# Patient Record
Sex: FEMALE | Race: BLACK OR AFRICAN AMERICAN | NOT HISPANIC OR LATINO | URBAN - METROPOLITAN AREA
[De-identification: names, ages, dates, MRNs, and addresses within clinical notes are randomized per-mention and may not be internally consistent; named-entity substitution may affect disease eponyms.]

---

## 2023-05-30 ENCOUNTER — HOSPITAL ENCOUNTER (OUTPATIENT)
Facility: OTHER | Age: 58
Discharge: HOME | End: 2023-05-31
Attending: EMERGENCY MEDICINE | Admitting: HOSPITALIST
Payer: MEDICAID

## 2023-05-30 DIAGNOSIS — D64.9 ANEMIA: ICD-10-CM

## 2023-05-30 DIAGNOSIS — R53.1 WEAKNESS: Primary | ICD-10-CM

## 2023-05-30 DIAGNOSIS — R06.09 DOE (DYSPNEA ON EXERTION): ICD-10-CM

## 2023-05-30 DIAGNOSIS — R07.9 CHEST PAIN: ICD-10-CM

## 2023-05-30 PROBLEM — R63.4 ABNORMAL INTENTIONAL WEIGHT LOSS: Status: ACTIVE | Noted: 2023-05-30

## 2023-05-30 PROBLEM — F40.232 FEAR ASSOCIATED WITH HEALTHCARE: Chronic | Status: ACTIVE | Noted: 2023-05-30

## 2023-05-30 PROBLEM — E87.6 HYPOKALEMIA: Status: ACTIVE | Noted: 2023-05-30

## 2023-05-30 PROBLEM — R42 LIGHTHEADEDNESS: Chronic | Status: ACTIVE | Noted: 2023-05-30

## 2023-05-30 LAB
ABO + RH BLD: NORMAL
ABO GROUP BLD: NORMAL
ALBUMIN SERPL BCP-MCNC: 3.6 G/DL (ref 3.5–5.2)
ALP SERPL-CCNC: 67 U/L (ref 55–135)
ALT SERPL W/O P-5'-P-CCNC: 10 U/L (ref 10–44)
ANION GAP SERPL CALC-SCNC: 9 MMOL/L (ref 8–16)
ANISOCYTOSIS BLD QL SMEAR: SLIGHT
ANISOCYTOSIS BLD QL SMEAR: SLIGHT
AST SERPL-CCNC: 12 U/L (ref 10–40)
BASOPHILS # BLD AUTO: 0.01 K/UL (ref 0–0.2)
BASOPHILS # BLD AUTO: 0.01 K/UL (ref 0–0.2)
BASOPHILS NFR BLD: 0.2 % (ref 0–1.9)
BASOPHILS NFR BLD: 0.2 % (ref 0–1.9)
BILIRUB SERPL-MCNC: 0.3 MG/DL (ref 0.1–1)
BILIRUB UR QL STRIP: NEGATIVE
BLD GP AB SCN CELLS X3 SERPL QL: NORMAL
BLD PROD TYP BPU: NORMAL
BLD PROD TYP BPU: NORMAL
BLOOD UNIT EXPIRATION DATE: NORMAL
BLOOD UNIT EXPIRATION DATE: NORMAL
BLOOD UNIT TYPE CODE: 600
BLOOD UNIT TYPE CODE: 6200
BLOOD UNIT TYPE: NORMAL
BLOOD UNIT TYPE: NORMAL
BUN SERPL-MCNC: 6 MG/DL (ref 6–20)
CALCIUM SERPL-MCNC: 8.8 MG/DL (ref 8.7–10.5)
CHLORIDE SERPL-SCNC: 110 MMOL/L (ref 95–110)
CLARITY UR: CLEAR
CO2 SERPL-SCNC: 22 MMOL/L (ref 23–29)
CODING SYSTEM: NORMAL
CODING SYSTEM: NORMAL
COLOR UR: COLORLESS
CREAT SERPL-MCNC: 0.6 MG/DL (ref 0.5–1.4)
CROSSMATCH INTERPRETATION: NORMAL
CROSSMATCH INTERPRETATION: NORMAL
DACRYOCYTES BLD QL SMEAR: ABNORMAL
DACRYOCYTES BLD QL SMEAR: ABNORMAL
DIFFERENTIAL METHOD: ABNORMAL
DIFFERENTIAL METHOD: ABNORMAL
DISPENSE STATUS: NORMAL
DISPENSE STATUS: NORMAL
EOSINOPHIL # BLD AUTO: 0.1 K/UL (ref 0–0.5)
EOSINOPHIL # BLD AUTO: 0.1 K/UL (ref 0–0.5)
EOSINOPHIL NFR BLD: 1.1 % (ref 0–8)
EOSINOPHIL NFR BLD: 1.1 % (ref 0–8)
ERYTHROCYTE [DISTWIDTH] IN BLOOD BY AUTOMATED COUNT: 23.7 % (ref 11.5–14.5)
ERYTHROCYTE [DISTWIDTH] IN BLOOD BY AUTOMATED COUNT: 23.7 % (ref 11.5–14.5)
EST. GFR  (NO RACE VARIABLE): >60 ML/MIN/1.73 M^2
FERRITIN SERPL-MCNC: 3 NG/ML (ref 20–300)
GLUCOSE SERPL-MCNC: 97 MG/DL (ref 70–110)
GLUCOSE UR QL STRIP: NEGATIVE
HCT VFR BLD AUTO: 12.6 % (ref 37–48.5)
HCT VFR BLD AUTO: 13.1 % (ref 37–48.5)
HGB BLD-MCNC: 3 G/DL (ref 12–16)
HGB BLD-MCNC: 3.1 G/DL (ref 12–16)
HGB UR QL STRIP: NEGATIVE
HYPOCHROMIA BLD QL SMEAR: ABNORMAL
HYPOCHROMIA BLD QL SMEAR: ABNORMAL
IMM GRANULOCYTES # BLD AUTO: 0.02 K/UL (ref 0–0.04)
IMM GRANULOCYTES # BLD AUTO: 0.03 K/UL (ref 0–0.04)
IMM GRANULOCYTES NFR BLD AUTO: 0.3 % (ref 0–0.5)
IMM GRANULOCYTES NFR BLD AUTO: 0.5 % (ref 0–0.5)
IRON SERPL-MCNC: 12 UG/DL (ref 30–160)
KETONES UR QL STRIP: NEGATIVE
LEUKOCYTE ESTERASE UR QL STRIP: NEGATIVE
LIPASE SERPL-CCNC: 50 U/L (ref 4–60)
LYMPHOCYTES # BLD AUTO: 1.5 K/UL (ref 1–4.8)
LYMPHOCYTES # BLD AUTO: 1.5 K/UL (ref 1–4.8)
LYMPHOCYTES NFR BLD: 22.8 % (ref 18–48)
LYMPHOCYTES NFR BLD: 25.2 % (ref 18–48)
MCH RBC QN AUTO: 14.4 PG (ref 27–31)
MCH RBC QN AUTO: 14.6 PG (ref 27–31)
MCHC RBC AUTO-ENTMCNC: 23.7 G/DL (ref 32–36)
MCHC RBC AUTO-ENTMCNC: 23.8 G/DL (ref 32–36)
MCV RBC AUTO: 61 FL (ref 82–98)
MCV RBC AUTO: 62 FL (ref 82–98)
MONOCYTES # BLD AUTO: 0.5 K/UL (ref 0.3–1)
MONOCYTES # BLD AUTO: 0.6 K/UL (ref 0.3–1)
MONOCYTES NFR BLD: 7.5 % (ref 4–15)
MONOCYTES NFR BLD: 8.6 % (ref 4–15)
NEUTROPHILS # BLD AUTO: 4 K/UL (ref 1.8–7.7)
NEUTROPHILS # BLD AUTO: 4.3 K/UL (ref 1.8–7.7)
NEUTROPHILS NFR BLD: 65.7 % (ref 38–73)
NEUTROPHILS NFR BLD: 66.8 % (ref 38–73)
NITRITE UR QL STRIP: NEGATIVE
NRBC BLD-RTO: 0 /100 WBC
NRBC BLD-RTO: 0 /100 WBC
NUM UNITS TRANS PACKED RBC: NORMAL
OVALOCYTES BLD QL SMEAR: ABNORMAL
OVALOCYTES BLD QL SMEAR: ABNORMAL
PH UR STRIP: 6 [PH] (ref 5–8)
PLATELET # BLD AUTO: 261 K/UL (ref 150–450)
PLATELET # BLD AUTO: 274 K/UL (ref 150–450)
PLATELET BLD QL SMEAR: ABNORMAL
PLATELET BLD QL SMEAR: ABNORMAL
PMV BLD AUTO: 10.5 FL (ref 9.2–12.9)
PMV BLD AUTO: 9.4 FL (ref 9.2–12.9)
POIKILOCYTOSIS BLD QL SMEAR: SLIGHT
POIKILOCYTOSIS BLD QL SMEAR: SLIGHT
POTASSIUM SERPL-SCNC: 3.1 MMOL/L (ref 3.5–5.1)
PROT SERPL-MCNC: 7.2 G/DL (ref 6–8.4)
PROT UR QL STRIP: NEGATIVE
RBC # BLD AUTO: 2.05 M/UL (ref 4–5.4)
RBC # BLD AUTO: 2.16 M/UL (ref 4–5.4)
RH BLD: NORMAL
SATURATED IRON: 2 % (ref 20–50)
SODIUM SERPL-SCNC: 141 MMOL/L (ref 136–145)
SP GR UR STRIP: 1.01 (ref 1–1.03)
SPECIMEN OUTDATE: NORMAL
TOTAL IRON BINDING CAPACITY: 570 UG/DL (ref 250–450)
TRANS ERYTHROCYTES VOL PATIENT: NORMAL ML
TRANSFERRIN SERPL-MCNC: 385 MG/DL (ref 200–375)
URN SPEC COLLECT METH UR: ABNORMAL
UROBILINOGEN UR STRIP-ACNC: NEGATIVE EU/DL
WBC # BLD AUTO: 6.1 K/UL (ref 3.9–12.7)
WBC # BLD AUTO: 6.49 K/UL (ref 3.9–12.7)

## 2023-05-30 PROCEDURE — 86920 COMPATIBILITY TEST SPIN: CPT | Performed by: HOSPITALIST

## 2023-05-30 PROCEDURE — 96374 THER/PROPH/DIAG INJ IV PUSH: CPT

## 2023-05-30 PROCEDURE — 93010 ELECTROCARDIOGRAM REPORT: CPT | Mod: ,,, | Performed by: INTERNAL MEDICINE

## 2023-05-30 PROCEDURE — 80053 COMPREHEN METABOLIC PANEL: CPT | Performed by: PHYSICIAN ASSISTANT

## 2023-05-30 PROCEDURE — 99285 EMERGENCY DEPT VISIT HI MDM: CPT | Mod: 25

## 2023-05-30 PROCEDURE — 99236 PR OBSERV/HOSP SAME DATE,LEVL V: ICD-10-PCS | Mod: ,,,

## 2023-05-30 PROCEDURE — 83690 ASSAY OF LIPASE: CPT | Performed by: PHYSICIAN ASSISTANT

## 2023-05-30 PROCEDURE — 36415 COLL VENOUS BLD VENIPUNCTURE: CPT | Performed by: EMERGENCY MEDICINE

## 2023-05-30 PROCEDURE — 84466 ASSAY OF TRANSFERRIN: CPT | Performed by: PHYSICIAN ASSISTANT

## 2023-05-30 PROCEDURE — 81003 URINALYSIS AUTO W/O SCOPE: CPT | Performed by: PHYSICIAN ASSISTANT

## 2023-05-30 PROCEDURE — C9113 INJ PANTOPRAZOLE SODIUM, VIA: HCPCS

## 2023-05-30 PROCEDURE — 86900 BLOOD TYPING SEROLOGIC ABO: CPT | Performed by: EMERGENCY MEDICINE

## 2023-05-30 PROCEDURE — 93005 ELECTROCARDIOGRAM TRACING: CPT

## 2023-05-30 PROCEDURE — 93010 EKG 12-LEAD: ICD-10-PCS | Mod: ,,, | Performed by: INTERNAL MEDICINE

## 2023-05-30 PROCEDURE — 36430 TRANSFUSION BLD/BLD COMPNT: CPT

## 2023-05-30 PROCEDURE — 99236 HOSP IP/OBS SAME DATE HI 85: CPT | Mod: ,,,

## 2023-05-30 PROCEDURE — 96375 TX/PRO/DX INJ NEW DRUG ADDON: CPT

## 2023-05-30 PROCEDURE — 63600175 PHARM REV CODE 636 W HCPCS

## 2023-05-30 PROCEDURE — 86920 COMPATIBILITY TEST SPIN: CPT | Performed by: EMERGENCY MEDICINE

## 2023-05-30 PROCEDURE — P9016 RBC LEUKOCYTES REDUCED: HCPCS | Performed by: EMERGENCY MEDICINE

## 2023-05-30 PROCEDURE — 85025 COMPLETE CBC W/AUTO DIFF WBC: CPT | Performed by: EMERGENCY MEDICINE

## 2023-05-30 PROCEDURE — 82728 ASSAY OF FERRITIN: CPT | Performed by: PHYSICIAN ASSISTANT

## 2023-05-30 PROCEDURE — 86920 COMPATIBILITY TEST SPIN: CPT | Performed by: NURSE PRACTITIONER

## 2023-05-30 PROCEDURE — 25000003 PHARM REV CODE 250

## 2023-05-30 PROCEDURE — G0378 HOSPITAL OBSERVATION PER HR: HCPCS

## 2023-05-30 PROCEDURE — P9021 RED BLOOD CELLS UNIT: HCPCS

## 2023-05-30 PROCEDURE — 86850 RBC ANTIBODY SCREEN: CPT | Performed by: EMERGENCY MEDICINE

## 2023-05-30 PROCEDURE — 85025 COMPLETE CBC W/AUTO DIFF WBC: CPT | Mod: 91 | Performed by: PHYSICIAN ASSISTANT

## 2023-05-30 PROCEDURE — 86920 COMPATIBILITY TEST SPIN: CPT

## 2023-05-30 PROCEDURE — 25000003 PHARM REV CODE 250: Performed by: EMERGENCY MEDICINE

## 2023-05-30 PROCEDURE — 96361 HYDRATE IV INFUSION ADD-ON: CPT

## 2023-05-30 RX ORDER — ONDANSETRON 2 MG/ML
4 INJECTION INTRAMUSCULAR; INTRAVENOUS EVERY 8 HOURS PRN
Status: DISCONTINUED | OUTPATIENT
Start: 2023-05-30 | End: 2023-05-31 | Stop reason: HOSPADM

## 2023-05-30 RX ORDER — MAG HYDROX/ALUMINUM HYD/SIMETH 200-200-20
30 SUSPENSION, ORAL (FINAL DOSE FORM) ORAL 4 TIMES DAILY PRN
Status: DISCONTINUED | OUTPATIENT
Start: 2023-05-30 | End: 2023-05-31 | Stop reason: HOSPADM

## 2023-05-30 RX ORDER — ONDANSETRON 2 MG/ML
4 INJECTION INTRAMUSCULAR; INTRAVENOUS
Status: DISCONTINUED | OUTPATIENT
Start: 2023-05-30 | End: 2023-05-30

## 2023-05-30 RX ORDER — POTASSIUM CHLORIDE 20 MEQ/1
20 TABLET, EXTENDED RELEASE ORAL ONCE
Status: COMPLETED | OUTPATIENT
Start: 2023-05-30 | End: 2023-05-30

## 2023-05-30 RX ORDER — ONDANSETRON 4 MG/1
4 TABLET, ORALLY DISINTEGRATING ORAL
Status: COMPLETED | OUTPATIENT
Start: 2023-05-30 | End: 2023-05-30

## 2023-05-30 RX ORDER — SODIUM CHLORIDE 0.9 % (FLUSH) 0.9 %
10 SYRINGE (ML) INJECTION EVERY 12 HOURS PRN
Status: DISCONTINUED | OUTPATIENT
Start: 2023-05-30 | End: 2023-05-31 | Stop reason: HOSPADM

## 2023-05-30 RX ORDER — HYDROCODONE BITARTRATE AND ACETAMINOPHEN 500; 5 MG/1; MG/1
TABLET ORAL
Status: DISCONTINUED | OUTPATIENT
Start: 2023-05-30 | End: 2023-05-31 | Stop reason: HOSPADM

## 2023-05-30 RX ORDER — NALOXONE HCL 0.4 MG/ML
0.02 VIAL (ML) INJECTION
Status: DISCONTINUED | OUTPATIENT
Start: 2023-05-30 | End: 2023-05-31 | Stop reason: HOSPADM

## 2023-05-30 RX ORDER — FERROUS SULFATE, DRIED 160(50) MG
1 TABLET, EXTENDED RELEASE ORAL 2 TIMES DAILY WITH MEALS
Status: DISCONTINUED | OUTPATIENT
Start: 2023-05-31 | End: 2023-05-31 | Stop reason: HOSPADM

## 2023-05-30 RX ORDER — SODIUM CHLORIDE, SODIUM LACTATE, POTASSIUM CHLORIDE, CALCIUM CHLORIDE 600; 310; 30; 20 MG/100ML; MG/100ML; MG/100ML; MG/100ML
INJECTION, SOLUTION INTRAVENOUS CONTINUOUS
Status: DISCONTINUED | OUTPATIENT
Start: 2023-05-30 | End: 2023-05-31 | Stop reason: HOSPADM

## 2023-05-30 RX ORDER — AMOXICILLIN 250 MG
1 CAPSULE ORAL DAILY PRN
Status: DISCONTINUED | OUTPATIENT
Start: 2023-05-30 | End: 2023-05-31 | Stop reason: HOSPADM

## 2023-05-30 RX ORDER — ACETAMINOPHEN 500 MG
1000 TABLET ORAL EVERY 8 HOURS PRN
Status: DISCONTINUED | OUTPATIENT
Start: 2023-05-30 | End: 2023-05-31 | Stop reason: HOSPADM

## 2023-05-30 RX ORDER — FERROUS SULFATE, DRIED 160(50) MG
1 TABLET, EXTENDED RELEASE ORAL 2 TIMES DAILY WITH MEALS
COMMUNITY

## 2023-05-30 RX ORDER — PANTOPRAZOLE SODIUM 40 MG/10ML
40 INJECTION, POWDER, LYOPHILIZED, FOR SOLUTION INTRAVENOUS 2 TIMES DAILY
Status: DISCONTINUED | OUTPATIENT
Start: 2023-05-30 | End: 2023-05-31 | Stop reason: HOSPADM

## 2023-05-30 RX ORDER — SIMETHICONE 80 MG
1 TABLET,CHEWABLE ORAL 4 TIMES DAILY PRN
Status: DISCONTINUED | OUTPATIENT
Start: 2023-05-30 | End: 2023-05-31 | Stop reason: HOSPADM

## 2023-05-30 RX ORDER — PROCHLORPERAZINE EDISYLATE 5 MG/ML
5 INJECTION INTRAMUSCULAR; INTRAVENOUS EVERY 6 HOURS PRN
Status: DISCONTINUED | OUTPATIENT
Start: 2023-05-30 | End: 2023-05-31 | Stop reason: HOSPADM

## 2023-05-30 RX ORDER — BISACODYL 10 MG
10 SUPPOSITORY, RECTAL RECTAL DAILY PRN
Status: DISCONTINUED | OUTPATIENT
Start: 2023-05-30 | End: 2023-05-31 | Stop reason: HOSPADM

## 2023-05-30 RX ORDER — TALC
6 POWDER (GRAM) TOPICAL NIGHTLY PRN
Status: DISCONTINUED | OUTPATIENT
Start: 2023-05-30 | End: 2023-05-31 | Stop reason: HOSPADM

## 2023-05-30 RX ADMIN — ACETAMINOPHEN 1000 MG: 500 TABLET, FILM COATED ORAL at 04:05

## 2023-05-30 RX ADMIN — ONDANSETRON 4 MG: 2 INJECTION INTRAMUSCULAR; INTRAVENOUS at 08:05

## 2023-05-30 RX ADMIN — Medication 6 MG: at 08:05

## 2023-05-30 RX ADMIN — ONDANSETRON 4 MG: 4 TABLET, ORALLY DISINTEGRATING ORAL at 11:05

## 2023-05-30 RX ADMIN — POTASSIUM CHLORIDE 20 MEQ: 1500 TABLET, EXTENDED RELEASE ORAL at 08:05

## 2023-05-30 RX ADMIN — PANTOPRAZOLE SODIUM 40 MG: 40 INJECTION, POWDER, LYOPHILIZED, FOR SOLUTION INTRAVENOUS at 08:05

## 2023-05-30 RX ADMIN — SODIUM CHLORIDE, POTASSIUM CHLORIDE, SODIUM LACTATE AND CALCIUM CHLORIDE: 600; 310; 30; 20 INJECTION, SOLUTION INTRAVENOUS at 09:05

## 2023-05-30 NOTE — Clinical Note
Diagnosis: Anemia [[id number]]   Future Attending Provider: DAYAMI WELCH [[id number]]   Admitting Provider:: DAYAMI WELCH [[id number]]

## 2023-05-30 NOTE — ED TRIAGE NOTES
N/V/D over past 2-3 days. Last emesis this morning, last diarrhea yesterday. Believes she ate some spoiled food. Subjective fever reported. Patient also reports chronic SOB with SWIFT - only able to walk short distances without feeling SOB. Presents in no distress.

## 2023-05-30 NOTE — ASSESSMENT & PLAN NOTE
"No hx of HTN, no cardiac hx, not taking any regular medication. Feeling lightheadedness for "a few months now". Vitals on arrival 95/52, HR 63. Pox 100%. Labs on arrival with H/H 3.0/12.6, currently having 2U RBC transfusion.     - Check orthostatics prior to discharge after completion of transfusion  "

## 2023-05-30 NOTE — ED PROVIDER NOTES
Encounter Date: 5/30/2023    SCRIBE #1 NOTE: IYuliana, uche scribing for, and in the presence of,  Julio Cesar Ramirez MD. I have scribed the following portions of the note - Other sections scribed: HPI, ROS.     History     Chief Complaint   Patient presents with    Vomiting     Patietn to ED with c.o. abdominal pain with associated nausea, vomiting, and diarrhea x 2 days after eating food at rouses that she believes was spoiled. Patient denies hematemesis/ bloody stools. Patient endorses subjective fever/ chills at home. Patient reports her last episode of vomiting was last night.      Time seen by provider: 10:37 AM    This is a 57 y.o. female who presents with complaint of worsening upper abdominal pain. Patient recalls having some spoiled ham from the grocery store three days ago, and started having nausea, vomiting, and diarrhea the next day. Her last episode of diarrhea occurred last night and she last vomited this morning. Other associated symptoms include lower back pains. While she does not take any prescription medications and denies any medical history, patient does admit that she avoids seeing the doctor. This is the extent of the patient's complaints at this time.    The history is provided by the patient.   Review of patient's allergies indicates:  No Known Allergies  History reviewed. No pertinent past medical history.  History reviewed. No pertinent surgical history.  Family History   Problem Relation Age of Onset    Breast cancer Mother     Throat cancer Father     Lung cancer Father     Diabetes Sister      Social History     Tobacco Use    Smoking status: Some Days     Types: Cigarettes    Smokeless tobacco: Never    Tobacco comments:     Occasional cigarette   Substance Use Topics    Alcohol use: Yes     Alcohol/week: 8.0 standard drinks     Types: 8 Drinks containing 0.5 oz of alcohol per week     Comment: occasionally, 2 days a week, 4 drinks per episode    Drug use: Not Currently      Review of Systems  Constitutional-no fever  HEENT-no congestion  Eyes-no redness  Respiratory-no shortness of breath  Cardio-no chest pain  GI-notes abdominal pain, nausea, vomiting, and diarrhea  Endocrine-no cold intolerance  -no difficulty urinating  MSK-no myalgias, notes back pain  Skin-no rashes  Allergy-no environmental allergy  Neurologic-, no headache  Hematology-no swollen nodes  Behavioral-no confusion    Physical Exam     Initial Vitals   BP Pulse Resp Temp SpO2   05/30/23 0925 05/30/23 0925 05/30/23 0925 05/30/23 0925 05/30/23 1444   (!) 109/54 72 17 98.2 °F (36.8 °C) 100 %      MAP       --                Physical Exam  Constitutional: Well appearing, no distress.  Eyes: Conjunctivae normal.  ENT       Head: Normocephalic, atraumatic.       Nose: No congestion.       Mouth/Throat: Mucous membranes are moist.  Hematological/Lymphatic/Immunilogical: No cervical lymphadenopathy.  Cardiovascular: Normal rate, regular rhythm. Normal and symmetric distal pulses.  Respiratory: Normal respiratory effort. Breath sounds are normal.  Gastrointestinal:  Soft, no rebound, no guarding  Musculoskeletal: Normal range of motion in all extremities. No obvious deformities or swelling.  Neurologic: Alert, oriented. Normal speech and language. No gross focal neurologic deficits are appreciated.  Skin: Skin is warm, dry. No rash noted.  Psychiatric: Mood and affect are normal.     ED Course   Critical Care    Date/Time: 5/30/2023 11:30 AM  Performed by: Julio Cesar Ramirez MD  Authorized by: Julio Cesar Ramirez MD   Direct patient critical care time: 35 minutes  Additional history critical care time: 6 minutes  Ordering / reviewing critical care time: 5 minutes  Documentation critical care time: 5 minutes  Consulting other physicians critical care time: 4 minutes  Total critical care time (exclusive of procedural time) : 55 minutes  Critical care time was exclusive of separately billable procedures and treating  other patients and teaching time.  Critical care was necessary to treat or prevent imminent or life-threatening deterioration of the following conditions: anemia.  Critical care was time spent personally by me on the following activities: blood draw for specimens, development of treatment plan with patient or surrogate, discussions with primary provider, interpretation of cardiac output measurements, evaluation of patient's response to treatment, examination of patient, obtaining history from patient or surrogate, ordering and performing treatments and interventions, ordering and review of laboratory studies, ordering and review of radiographic studies, pulse oximetry, re-evaluation of patient's condition and review of old charts.      Labs Reviewed   CBC W/ AUTO DIFFERENTIAL - Abnormal; Notable for the following components:       Result Value    RBC 2.16 (*)     Hemoglobin 3.1 (*)     Hematocrit 13.1 (*)     MCV 61 (*)     MCH 14.4 (*)     MCHC 23.7 (*)     RDW 23.7 (*)     All other components within normal limits    Narrative:     Hgb, Hct critical result(s) called and verbal readback obtained from   Best Flores RN by [facility] 05/30/2023 11:27   COMPREHENSIVE METABOLIC PANEL - Abnormal; Notable for the following components:    Potassium 3.1 (*)     CO2 22 (*)     All other components within normal limits   URINALYSIS, REFLEX TO URINE CULTURE - Abnormal; Notable for the following components:    Color, UA Colorless (*)     All other components within normal limits    Narrative:     Specimen Source->Urine   CBC W/ AUTO DIFFERENTIAL - Abnormal; Notable for the following components:    RBC 2.05 (*)     Hemoglobin 3.0 (*)     Hematocrit 12.6 (*)     MCV 62 (*)     MCH 14.6 (*)     MCHC 23.8 (*)     RDW 23.7 (*)     All other components within normal limits    Narrative:     Hgb, Hct critical result(s) called and verbal readback obtained from   Fausto Becker RN by [facility] 05/30/2023 13:59   LIPASE   IRON AND TIBC    FERRITIN   TYPE & SCREEN   GROUP & RH   PREPARE RBC SOFT        ECG Results              EKG 12-lead (Final result)  Result time 05/30/23 14:54:06      Final result by Interface, Lab In [facility] (05/30/23 14:54:06)                   Narrative:    Test Reason : D64.9,    Vent. Rate : 057 BPM     Atrial Rate : 057 BPM     P-R Int : 178 ms          QRS Dur : 152 ms      QT Int : 530 ms       P-R-T Axes : 064 -45 035 degrees     QTc Int : 515 ms    Sinus bradycardia  Right bundle branch block  Left anterior fascicular block   Bifascicular block   Abnormal ECG    Confirmed by Yue POLO, Maykel BONILLA ([id number]) on 5/30/2023 2:54:00 PM    Referred By: [email or web address]   SELF           Confirmed By:Maykel Turner MD                      Wet Read by Julio Cesar Ramirez MD (05/30/23 14:13:27, [facility], Emergency Medicine)    My EKG interpretation, sinus bradycardia, 57 beats per minute, left axis deviation, bifascicular block, no previous EKG for comparison                                  Imaging Results    None          Medications   ondansetron disintegrating tablet 4 mg (4 mg Oral Given 5/30/23 1116)   potassium chloride SA CR tablet 20 mEq (20 mEq Oral Given 5/30/23 2052)   HYDROcodone-acetaminophen 5-325 mg per tablet 1 tablet (1 tablet Oral Given 5/31/23 0246)   potassium bicarbonate disintegrating tablet 40 mEq (40 mEq Oral Given 5/31/23 0824)     Medical Decision Making:   History:   Old Medical Records: I decided to obtain old medical records.  Old Records Summarized: records from clinic visits and records from previous admission(s).  Independently Interpreted Test(s):   I have ordered and independently interpreted EKG Reading(s) - see prior notes  Clinical Tests:   Lab Tests: Ordered and Reviewed  Medical Tests: Ordered and Reviewed  ED Management:  57-year-old woman who presented for which she believes is food poisoning, recurrent diarrhea and vomiting post eating bad ham from grocery store.    Has a markedly  diminished H&H, may have an element of a bleed at this time though this is likely chronic in nature.    Discussed with patient need for transfusion emergently given likelihood for decompensation related to markedly diminished H&H will plan for admission at this time administration of blood products, reassessment.    Discussed with on-call Hospital Medicine team.  Patient does endorse that she does have episodic rectal bleeding on occasion, occasional constipation.  This may be a more significant underlying medical condition with significant risk for decompensation.        Scribe Attestation:   Scribe #1: I performed the above scribed service and the documentation accurately describes the services I performed. I attest to the accuracy of the note.  Physician Attestation for Scribe: I, julio cesar ramirez, reviewed documentation as scribed in my presence, which is both accurate and complete.                     Clinical Impression:   Final diagnoses:  [D64.9] Anemia  [R53.1] Weakness (Primary)        ED Disposition Condition    Observation Stable                Julio Cesar Ramirez MD  06/02/23 0050

## 2023-05-30 NOTE — ASSESSMENT & PLAN NOTE
K 3.1 on arrival. Patient reports some nausea, vomiting and diarrhea after eating some bad food from [employer]. Nausea abated with medication in ED. Diarrhea abated. Likely from GI losses.    - Replete K  - Trend BMP

## 2023-05-30 NOTE — ASSESSMENT & PLAN NOTE
"Patient with SWIFT, SOB, lightheadedness for "quite a while". No peripheral lower extremity swelling. Patient has no cardiac hx, no previous Echo done. EKG with RBBB. Labs on arrival with H/H 3.0/12.6. Systolic murmur heard, will do Echo.     Plan:  - Echo  - Transfuse 2U RBC to keep Hgb > 7  - Consider consult to Cardiology if Echo is abnormal  - BNP with AM labs  "

## 2023-05-30 NOTE — HPI
"Anjelica Fajardo is a 57 year old lady with no pertinent hx, here for N/V/D over past 2-3 days. Patient also reports chronic SOB with SWIFT. Patient decided to seek ED care today because of worsening lightheadedness. Patient reports having per vaginal bleeding but stopped since menopause "years ago". Patient also reports having per rectal bleeding after PV bleeding "red blood clots with poop if I drank too much alcohol" but has since stopped 3-4 years back. Current BMs are beige in color. No wounds, no bloody emesis, no current PV or [place] bleeding, no overt bleeding. Patient denies fever, chills, cough, sore throat, cough with purulent sputum, dysuria. Patient reports nausea, vomiting and diarrhea has abated.     Vitals on arrival 95/52, HR 63. Pox 100%. Labs on arrival with H/H 3.0/12.6. No leukocytosis, plt normal. Anemia panel with iron 12, TIBC 570, ferritin 3, sat iron 2, transferrin 385. K 3.1. Cr 0.6 at baseline. EKG with right bundle branch block. Systolic murmur noted on auscultation. No CXR available for evaluation, but lung sounds are clear. 1U RBC started in ED, with another ongoing on the floor, total 2U RBC transfused. Patient was very adamant about getting any GI evaluation with scopes, due to distrust in the healthcare system from previous experience with loved one.    Patient is admitted to Hospital Medicine for management and evaluation of symptomatic anemia with a consult to GI.   "

## 2023-05-30 NOTE — ASSESSMENT & PLAN NOTE
" Lightheadedness, SWIFT  Lightheadedness, "woozy", SWIFT, SOB for a few months, finally presented to ED after feeling worsening lightheadedness. Reports having per vaginal bleeding but stopped since menopause "years ago". Reports having per rectal bleeding after PV bleeding "red blood clots with poop if I drank too much alcohol" but has since stopped 3-4 years back. Current BMs are beige in color. No wounds, no bloody emesis, no current PV or [place] bleeding, no overt bleeding. Vitals on arrival 95/52, HR 63. Pox 100%. Labs on arrival with H/H 3.0/12.6, currently having 2U RBC transfusion. Patient never had a EGD or colonoscopy done, thinks these procedures are "experimenting on me".     Plan:  - Transfuse 2U RBC  - Trend H/H  - Consult to GI in AM   "

## 2023-05-30 NOTE — FIRST PROVIDER EVALUATION
Emergency Department TeleTriage Encounter Note      CHIEF COMPLAINT    Chief Complaint   Patient presents with    Vomiting     Patietn to ED with c.o. abdominal pain with associated nausea, vomiting, and diarrhea x 2 days after eating food at [employer] that she believes was spoiled. Patient denies hematemesis/ bloody stools. Patient endorses subjective fever/ chills at home. Patient reports her last episode of vomiting was last night.        VITAL SIGNS   Initial Vitals [05/30/23 0925]   BP Pulse Resp Temp SpO2   (!) 109/54 72 17 98.2 °F (36.8 °C) --      MAP       --            ALLERGIES    Review of patient's allergies indicates:  Not on File    PROVIDER TRIAGE NOTE  Patient presents with nausea, vomiting and diarrhea. Thinks she ate some bad meat.       ORDERS  Labs Reviewed - No data to display    ED Orders (720h ago, onward)      None              Virtual Visit Note: The provider triage portion of this emergency department evaluation and documentation was performed via [employer], a HIPAA-compliant telemedicine application, in concert with a tele-presenter in the room. A face to face patient evaluation with one of my colleagues will occur once the patient is placed in an emergency department room.      DISCLAIMER: This note was prepared with [employer] voice recognition transcription software. Garbled syntax, mangled pronouns, and other bizarre constructions may be attributed to that software system.

## 2023-05-30 NOTE — ASSESSMENT & PLAN NOTE
Fear associated with healthcare  Patient reports 50lbs weight loss over the last 6 months associated with lack of appetite. Patient reports she is still reeling from grief from losing her 2nd youngest son and sister in [id number]. Patient's mom and dad both had cancer. Patient presents with significant anemia with no overt bleeding. Patient has distrust of healthcare in general following events that happened with her older sister, whom unfortunately passed away. Patient does not want to entertain any preventative screening.     Plan:  - Consult to GI   - Dietitian consult   - Patient needs outpatient PCP, does not have one currently

## 2023-05-31 ENCOUNTER — HOSPITAL ENCOUNTER (OUTPATIENT)
Dept: CARDIOLOGY | Facility: OTHER | Age: 58
Discharge: HOME | End: 2023-05-31
Payer: MEDICAID

## 2023-05-31 VITALS
BODY MASS INDEX: 25.77 KG/M2 | HEART RATE: 58 BPM | DIASTOLIC BLOOD PRESSURE: 60 MMHG | WEIGHT: 174 LBS | HEIGHT: 69 IN | SYSTOLIC BLOOD PRESSURE: 118 MMHG

## 2023-05-31 VITALS
HEIGHT: 69 IN | RESPIRATION RATE: 18 BRPM | DIASTOLIC BLOOD PRESSURE: 77 MMHG | HEART RATE: 63 BPM | BODY MASS INDEX: 25.86 KG/M2 | OXYGEN SATURATION: 98 % | TEMPERATURE: 98 F | WEIGHT: 174.63 LBS | SYSTOLIC BLOOD PRESSURE: 134 MMHG

## 2023-05-31 DIAGNOSIS — F17.200 NEEDS SMOKING CESSATION EDUCATION: ICD-10-CM

## 2023-05-31 PROBLEM — R11.2 NAUSEA & VOMITING: Status: ACTIVE | Noted: 2023-05-31

## 2023-05-31 PROBLEM — D50.0 IRON DEFICIENCY ANEMIA DUE TO CHRONIC BLOOD LOSS: Status: ACTIVE | Noted: 2023-05-31

## 2023-05-31 LAB
ANION GAP SERPL CALC-SCNC: 7 MMOL/L (ref 8–16)
ASCENDING AORTA: 3.03 CM
AV INDEX (PROSTH): 0.71
AV MEAN GRADIENT: 10 MMHG
AV PEAK GRADIENT: 21 MMHG
AV REGURGITATION PRESSURE HALF TIME: 446 MS
AV VALVE AREA: 2.43 CM2
AV VELOCITY RATIO: 0.64
BLD PROD TYP BPU: NORMAL
BLOOD UNIT EXPIRATION DATE: NORMAL
BLOOD UNIT TYPE CODE: 6200
BLOOD UNIT TYPE: NORMAL
BNP SERPL-MCNC: 236 PG/ML (ref 0–99)
BSA FOR ECHO PROCEDURE: 1.96 M2
BUN SERPL-MCNC: 9 MG/DL (ref 6–20)
CALCIUM SERPL-MCNC: 8.4 MG/DL (ref 8.7–10.5)
CHLORIDE SERPL-SCNC: 109 MMOL/L (ref 95–110)
CHOLEST SERPL-MCNC: 104 MG/DL (ref 120–199)
CHOLEST/HDLC SERPL: 3.7 {RATIO} (ref 2–5)
CO2 SERPL-SCNC: 23 MMOL/L (ref 23–29)
CODING SYSTEM: NORMAL
CREAT SERPL-MCNC: 0.6 MG/DL (ref 0.5–1.4)
CROSSMATCH INTERPRETATION: NORMAL
CV ECHO LV RWT: 0.19 CM
DISPENSE STATUS: NORMAL
DOP CALC AO PEAK VEL: 2.3 M/S
DOP CALC AO VTI: 45.2 CM
DOP CALC LVOT AREA: 3.4 CM2
DOP CALC LVOT DIAMETER: 2.09 CM
DOP CALC LVOT PEAK VEL: 1.48 M/S
DOP CALC LVOT STROKE VOLUME: 109.73 CM3
DOP CALC RVOT PEAK VEL: 1.47 M/S
DOP CALC RVOT VTI: 41.1 CM
DOP CALCLVOT PEAK VEL VTI: 32 CM
E WAVE DECELERATION TIME: 265.09 MSEC
E/A RATIO: 1.35
E/E' RATIO: 13 M/S
ECHO LV POSTERIOR WALL: 0.57 CM (ref 0.6–1.1)
EJECTION FRACTION: 45 %
ERYTHROCYTE [DISTWIDTH] IN BLOOD BY AUTOMATED COUNT: 28.2 % (ref 11.5–14.5)
ERYTHROCYTE [DISTWIDTH] IN BLOOD BY AUTOMATED COUNT: 29.7 % (ref 11.5–14.5)
EST. GFR  (NO RACE VARIABLE): >60 ML/MIN/1.73 M^2
ESTIMATED AVG GLUCOSE: 103 MG/DL (ref 68–131)
FRACTIONAL SHORTENING: 18 % (ref 28–44)
GLUCOSE SERPL-MCNC: 97 MG/DL (ref 70–110)
HBA1C MFR BLD: 5.2 % (ref 4–5.6)
HCT VFR BLD AUTO: 16.4 % (ref 37–48.5)
HCT VFR BLD AUTO: 27.3 % (ref 37–48.5)
HDLC SERPL-MCNC: 28 MG/DL (ref 40–75)
HDLC SERPL: 26.9 % (ref 20–50)
HGB BLD-MCNC: 4.6 G/DL (ref 12–16)
HGB BLD-MCNC: 8 G/DL (ref 12–16)
INTERVENTRICULAR SEPTUM: 0.55 CM (ref 0.6–1.1)
IVC DIAMETER: 1.81 CM
IVRT: 121.79 MSEC
LA MAJOR: 5.53 CM
LA MINOR: 5.69 CM
LA WIDTH: 4.6 CM
LDLC SERPL CALC-MCNC: 58.4 MG/DL (ref 63–159)
LEFT ATRIUM SIZE: 4.36 CM
LEFT ATRIUM VOLUME INDEX MOD: 37.9 ML/M2
LEFT ATRIUM VOLUME INDEX: 49 ML/M2
LEFT ATRIUM VOLUME MOD: 74 CM3
LEFT ATRIUM VOLUME: 95.62 CM3
LEFT INTERNAL DIMENSION IN SYSTOLE: 4.87 CM (ref 2.1–4)
LEFT VENTRICLE DIASTOLIC VOLUME INDEX: 91.23 ML/M2
LEFT VENTRICLE DIASTOLIC VOLUME: 177.9 ML
LEFT VENTRICLE MASS INDEX: 62 G/M2
LEFT VENTRICLE SYSTOLIC VOLUME INDEX: 57.1 ML/M2
LEFT VENTRICLE SYSTOLIC VOLUME: 111.34 ML
LEFT VENTRICULAR INTERNAL DIMENSION IN DIASTOLE: 5.97 CM (ref 3.5–6)
LEFT VENTRICULAR MASS: 120.1 G
LV LATERAL E/E' RATIO: 11.56 M/S
LV SEPTAL E/E' RATIO: 14.86 M/S
LVOT MG: 4.15 MMHG
LVOT MV: 0.94 CM/S
MAGNESIUM SERPL-MCNC: 2 MG/DL (ref 1.6–2.6)
MCH RBC QN AUTO: 19.4 PG (ref 27–31)
MCH RBC QN AUTO: 22.7 PG (ref 27–31)
MCHC RBC AUTO-ENTMCNC: 28 G/DL (ref 32–36)
MCHC RBC AUTO-ENTMCNC: 29.3 G/DL (ref 32–36)
MCV RBC AUTO: 69 FL (ref 82–98)
MCV RBC AUTO: 77 FL (ref 82–98)
MV PEAK A VEL: 0.77 M/S
MV PEAK E VEL: 1.04 M/S
MV STENOSIS PRESSURE HALF TIME: 76.88 MS
MV VALVE AREA P 1/2 METHOD: 2.86 CM2
NONHDLC SERPL-MCNC: 76 MG/DL
NUM UNITS TRANS PACKED RBC: NORMAL
PISA AR MAX VEL: 3.94 M/S
PISA MRMAX VEL: 5.63 M/S
PISA TR MAX VEL: 2.63 M/S
PLATELET # BLD AUTO: 196 K/UL (ref 150–450)
PLATELET # BLD AUTO: 225 K/UL (ref 150–450)
PMV BLD AUTO: 9.4 FL (ref 9.2–12.9)
PMV BLD AUTO: 9.6 FL (ref 9.2–12.9)
POTASSIUM SERPL-SCNC: 3.4 MMOL/L (ref 3.5–5.1)
PULM VEIN S/D RATIO: 1.81
PV MEAN GRADIENT: 5.93 MMHG
PV PEAK D VEL: 0.27 M/S
PV PEAK S VEL: 0.49 M/S
PV PEAK VELOCITY: 1.52 CM/S
RA MAJOR: 4.83 CM
RA PRESSURE: 8 MMHG
RA WIDTH: 4.2 CM
RBC # BLD AUTO: 2.37 M/UL (ref 4–5.4)
RBC # BLD AUTO: 3.53 M/UL (ref 4–5.4)
RIGHT VENTRICULAR END-DIASTOLIC DIMENSION: 3.72 CM
RV TISSUE DOPPLER FREE WALL SYSTOLIC VELOCITY 1 (APICAL 4 CHAMBER VIEW): 10 CM/S
SINUS: 3.1 CM
SODIUM SERPL-SCNC: 139 MMOL/L (ref 136–145)
STJ: 2.85 CM
TDI LATERAL: 0.09 M/S
TDI SEPTAL: 0.07 M/S
TDI: 0.08 M/S
TR MAX PG: 28 MMHG
TRANS ERYTHROCYTES VOL PATIENT: NORMAL ML
TRANS ERYTHROCYTES VOL PATIENT: NORMAL ML
TRICUSPID ANNULAR PLANE SYSTOLIC EXCURSION: 2.1 CM
TRIGL SERPL-MCNC: 88 MG/DL (ref 30–150)
TV REST PULMONARY ARTERY PRESSURE: 36 MMHG
WBC # BLD AUTO: 11.25 K/UL (ref 3.9–12.7)
WBC # BLD AUTO: 8.32 K/UL (ref 3.9–12.7)

## 2023-05-31 PROCEDURE — 25000003 PHARM REV CODE 250

## 2023-05-31 PROCEDURE — 25000003 PHARM REV CODE 250: Performed by: HOSPITALIST

## 2023-05-31 PROCEDURE — 80048 BASIC METABOLIC PNL TOTAL CA: CPT

## 2023-05-31 PROCEDURE — 83036 HEMOGLOBIN GLYCOSYLATED A1C: CPT

## 2023-05-31 PROCEDURE — 93306 TTE W/DOPPLER COMPLETE: CPT

## 2023-05-31 PROCEDURE — C9113 INJ PANTOPRAZOLE SODIUM, VIA: HCPCS

## 2023-05-31 PROCEDURE — 83880 ASSAY OF NATRIURETIC PEPTIDE: CPT

## 2023-05-31 PROCEDURE — 96376 TX/PRO/DX INJ SAME DRUG ADON: CPT

## 2023-05-31 PROCEDURE — 25000003 PHARM REV CODE 250: Performed by: NURSE PRACTITIONER

## 2023-05-31 PROCEDURE — 63600175 PHARM REV CODE 636 W HCPCS

## 2023-05-31 PROCEDURE — 80061 LIPID PANEL: CPT

## 2023-05-31 PROCEDURE — P9021 RED BLOOD CELLS UNIT: HCPCS | Mod: 59 | Performed by: HOSPITALIST

## 2023-05-31 PROCEDURE — 99239 PR HOSPITAL DISCHARGE DAY,>30 MIN: ICD-10-PCS | Mod: ,,, | Performed by: NURSE PRACTITIONER

## 2023-05-31 PROCEDURE — 93306 ECHO (CUPID ONLY): ICD-10-PCS | Mod: 26,,, | Performed by: INTERNAL MEDICINE

## 2023-05-31 PROCEDURE — 83735 ASSAY OF MAGNESIUM: CPT

## 2023-05-31 PROCEDURE — P9040 RBC LEUKOREDUCED IRRADIATED: HCPCS | Performed by: NURSE PRACTITIONER

## 2023-05-31 PROCEDURE — 85027 COMPLETE CBC AUTOMATED: CPT | Mod: 91 | Performed by: HOSPITALIST

## 2023-05-31 PROCEDURE — 36415 COLL VENOUS BLD VENIPUNCTURE: CPT | Performed by: HOSPITALIST

## 2023-05-31 PROCEDURE — 96361 HYDRATE IV INFUSION ADD-ON: CPT

## 2023-05-31 PROCEDURE — 99239 HOSP IP/OBS DSCHRG MGMT >30: CPT | Mod: ,,, | Performed by: NURSE PRACTITIONER

## 2023-05-31 PROCEDURE — G0378 HOSPITAL OBSERVATION PER HR: HCPCS

## 2023-05-31 PROCEDURE — 85027 COMPLETE CBC AUTOMATED: CPT

## 2023-05-31 PROCEDURE — 93306 TTE W/DOPPLER COMPLETE: CPT | Mod: 26,,, | Performed by: INTERNAL MEDICINE

## 2023-05-31 RX ORDER — LOPERAMIDE HYDROCHLORIDE 2 MG/1
2 CAPSULE ORAL 4 TIMES DAILY PRN
Status: DISCONTINUED | OUTPATIENT
Start: 2023-05-31 | End: 2023-05-31 | Stop reason: HOSPADM

## 2023-05-31 RX ORDER — AMOXICILLIN 250 MG
1 CAPSULE ORAL DAILY PRN
Qty: 30 TABLET | Refills: 2 | Status: SHIPPED | OUTPATIENT
Start: 2023-05-31

## 2023-05-31 RX ORDER — MORPHINE SULFATE 2 MG/ML
2 INJECTION, SOLUTION INTRAMUSCULAR; INTRAVENOUS ONCE
Status: DISCONTINUED | OUTPATIENT
Start: 2023-05-31 | End: 2023-05-31

## 2023-05-31 RX ORDER — HYDROCODONE BITARTRATE AND ACETAMINOPHEN 5; 325 MG/1; MG/1
1 TABLET ORAL ONCE
Status: COMPLETED | OUTPATIENT
Start: 2023-05-31 | End: 2023-05-31

## 2023-05-31 RX ORDER — HYDROCODONE BITARTRATE AND ACETAMINOPHEN 500; 5 MG/1; MG/1
TABLET ORAL
Status: DISCONTINUED | OUTPATIENT
Start: 2023-05-31 | End: 2023-05-31 | Stop reason: HOSPADM

## 2023-05-31 RX ORDER — LANOLIN ALCOHOL/MO/W.PET/CERES
1 CREAM (GRAM) TOPICAL DAILY
Status: DISCONTINUED | OUTPATIENT
Start: 2023-05-31 | End: 2023-05-31 | Stop reason: HOSPADM

## 2023-05-31 RX ORDER — FERROUS SULFATE 325(65) MG
325 TABLET, DELAYED RELEASE (ENTERIC COATED) ORAL DAILY
Qty: 30 TABLET | Refills: 2 | Status: SHIPPED | OUTPATIENT
Start: 2023-05-31

## 2023-05-31 RX ADMIN — PANTOPRAZOLE SODIUM 40 MG: 40 INJECTION, POWDER, LYOPHILIZED, FOR SOLUTION INTRAVENOUS at 09:05

## 2023-05-31 RX ADMIN — FERROUS SULFATE TAB 325 MG (65 MG ELEMENTAL FE) 1 EACH: 325 (65 FE) TAB at 08:05

## 2023-05-31 RX ADMIN — ACETAMINOPHEN 1000 MG: 500 TABLET, FILM COATED ORAL at 10:05

## 2023-05-31 RX ADMIN — POTASSIUM BICARBONATE 40 MEQ: 391 TABLET, EFFERVESCENT ORAL at 08:05

## 2023-05-31 RX ADMIN — HYDROCODONE BITARTRATE AND ACETAMINOPHEN 1 TABLET: 5; 325 TABLET ORAL at 02:05

## 2023-05-31 RX ADMIN — Medication 1 TABLET: at 08:05

## 2023-05-31 NOTE — H&P
"[facility] [address] Medicine  History & Physical    Patient Name: Anjelica Fajardo  MRN: [id number]  Patient Class: OP- Observation  Admission Date: 5/30/2023  Attending Physician: Kiana Bhatt MD   Primary Care Provider: Primary Doctor No    Patient information was obtained from patient, past medical records and ER records.     Subjective:     Principal Problem:Symptomatic anemia    Chief Complaint:   Chief Complaint   Patient presents with    Vomiting     Patietn to ED with c.o. abdominal pain with associated nausea, vomiting, and diarrhea x 2 days after eating food at [employer] that she believes was spoiled. Patient denies hematemesis/ bloody stools. Patient endorses subjective fever/ chills at home. Patient reports her last episode of vomiting was last night.         HPI: Anjelica Fajardo is a 57 year old lady with no pertinent hx, here for N/V/D over past 2-3 days. Patient also reports chronic SOB with SWIFT. Patient decided to seek ED care today because of worsening lightheadedness. Patient reports having per vaginal bleeding but stopped since menopause "years ago". Patient also reports having per rectal bleeding after PV bleeding "red blood clots with poop if I drank too much alcohol" but has since stopped 3-4 years back. Current BMs are beige in color. No wounds, no bloody emesis, no current PV or NE bleeding, no overt bleeding. Patient denies fever, chills, cough, sore throat, cough with purulent sputum, dysuria. Patient reports nausea, vomiting and diarrhea has abated.     Vitals on arrival 95/52, HR 63. Pox 100%. Labs on arrival with H/H 3.0/12.6. No leukocytosis, plt normal. Anemia panel with iron 12, TIBC 570, ferritin 3, sat iron 2, transferrin 385. K 3.1. Cr 0.6 at baseline. EKG with right bundle branch block. Systolic murmur noted on auscultation. No CXR available for evaluation, but lung sounds are clear. 1U RBC started in ED, with another ongoing on the floor, total 2U RBC " transfused. Patient was very adamant about getting any GI evaluation with scopes, due to distrust in the healthcare system from previous experience with loved one.    Patient is admitted to Hospital Medicine for management and evaluation of symptomatic anemia with a consult to GI.       History reviewed. No pertinent past medical history.    History reviewed. No pertinent surgical history.    Review of patient's allergies indicates:  Not on File    No current facility-administered medications on file prior to encounter.     Current Outpatient Medications on File Prior to Encounter   Medication Sig    calcium-vitamin D3 (OS-JOEL 500 + D3) 500 mg-5 mcg (200 unit) per tablet Take 1 tablet by mouth 2 (two) times daily with meals.     Family History       Problem Relation (Age of Onset)    Breast cancer Mother    Diabetes Sister    Lung cancer Father    Throat cancer Father          Tobacco Use    Smoking status: Some Days     Types: Cigarettes    Smokeless tobacco: Never    Tobacco comments:     Occasional cigarette   Substance and Sexual Activity    Alcohol use: Yes     Alcohol/week: 8.0 standard drinks     Types: 8 Drinks containing 0.5 oz of alcohol per week     Comment: occasionally, 2 days a week, 4 drinks per episode    Drug use: Not Currently    Sexual activity: Not on file     Review of Systems   Constitutional:  Positive for fatigue. Negative for chills and fever.   HENT:  Negative for congestion and sore throat.    Eyes:  Negative for pain and redness.   Respiratory:  Positive for shortness of breath. Negative for cough and wheezing.    Cardiovascular:  Negative for chest pain and leg swelling.   Gastrointestinal:  Positive for diarrhea, nausea and vomiting. Negative for anal bleeding, blood in stool and constipation.   Genitourinary:  Negative for difficulty urinating, dysuria and vaginal bleeding.   Musculoskeletal:  Positive for back pain (chronic). Negative for gait problem.   Skin:  Negative for  rash and wound.   Neurological:  Positive for weakness (generalized) and light-headedness. Negative for numbness.   Psychiatric/Behavioral:  Negative for agitation and behavioral problems.    Objective:     Vital Signs (Most Recent):  Temp: 97.9 °F (36.6 °C) (05/30/23 2058)  Pulse: 62 (05/31/23 0000)  Resp: 20 (05/30/23 2058)  BP: (!) 126/58 (05/30/23 2058)  SpO2: 98 % (05/30/23 2058) Vital Signs (24h Range):  Temp:  [96.7 °F (35.9 °C)-98.6 °F (37 °C)] 97.9 °F (36.6 °C)  Pulse:  [61-74] 62  Resp:  [16-20] 20  SpO2:  [98 %-100 %] 98 %  BP: ()/(46-58) 126/58     Weight: 79.2 kg (174 lb 9.7 oz)  Body mass index is 25.78 kg/m².     Physical Exam  Vitals and nursing note reviewed.   Constitutional:       General: She is not in acute distress.     Appearance: She is not ill-appearing or toxic-appearing.   HENT:      Head: Normocephalic and atraumatic.      Nose: No congestion or rhinorrhea.   Eyes:      General: No scleral icterus.        Right eye: No discharge.         Left eye: No discharge.   Cardiovascular:      Rate and Rhythm: Regular rhythm.      Heart sounds: Murmur heard.   Pulmonary:      Effort: No respiratory distress.      Breath sounds: Normal breath sounds.   Abdominal:      General: Bowel sounds are normal.      Palpations: Abdomen is soft.      Tenderness: There is no abdominal tenderness.   Musculoskeletal:      Right lower leg: No edema.      Left lower leg: No edema.   Skin:     General: Skin is warm and dry.   Neurological:      Mental Status: She is alert and oriented to person, place, and time. Mental status is at baseline.   Psychiatric:         Mood and Affect: Mood normal.         Behavior: Behavior normal.              Significant Labs: All pertinent labs within the past 24 hours have been reviewed.  BMP:   Recent Labs   Lab 05/30/23  1014   GLU 97      K 3.1*      CO2 22*   BUN 6   CREATININE 0.6   CALCIUM 8.8     CBC:   Recent Labs   Lab 05/30/23  1014 05/30/23  1329   WBC  "6.49 6.10   HGB 3.1* 3.0*   HCT 13.1* 12.6*    261     CMP:   Recent Labs   Lab 05/30/23  1014      K 3.1*      CO2 22*   GLU 97   BUN 6   CREATININE 0.6   CALCIUM 8.8   PROT 7.2   ALBUMIN 3.6   BILITOT 0.3   ALKPHOS 67   AST 12   ALT 10   ANIONGAP 9     Cardiac Markers: No results for input(s): CKMB, MYOGLOBIN, BNP, TROPISTAT in the last 48 hours.  Magnesium: No results for input(s): MG in the last 48 hours.  Troponin: No results for input(s): TROPONINI, TROPONINIHS in the last 48 hours.  TSH: No results for input(s): TSH in the last 4320 hours.  Urine Culture: No results for input(s): LABURIN in the last 48 hours.  Urine Studies:   Recent Labs   Lab 05/30/23  1114   COLORU Colorless*   APPEARANCEUA Clear   PHUR 6.0   SPECGRAV 1.010   PROTEINUA Negative   GLUCUA Negative   KETONESU Negative   BILIRUBINUA Negative   OCCULTUA Negative   NITRITE Negative   UROBILINOGEN Negative   LEUKOCYTESUR Negative       Significant Imaging: I have reviewed and interpreted all pertinent imaging results/findings within the past 24 hours.    Assessment/Plan:     * Symptomatic anemia   Lightheadedness, SWIFT  Lightheadedness, "woozy", SWIFT, SOB for a few months, finally presented to ED after feeling worsening lightheadedness. Reports having per vaginal bleeding but stopped since menopause "years ago". Reports having per rectal bleeding after PV bleeding "red blood clots with poop if I drank too much alcohol" but has since stopped 3-4 years back. Current BMs are beige in color. No wounds, no bloody emesis, no current PV or ND bleeding, no overt bleeding. Vitals on arrival 95/52, HR 63. Pox 100%. Labs on arrival with H/H 3.0/12.6, currently having 2U RBC transfusion. Patient never had a EGD or colonoscopy done, thinks these procedures are "experimenting on me".     Plan:  - Transfuse 2U RBC  - Trend H/H  - Consult to GI in AM     Lightheadedness  No hx of HTN, no cardiac hx, not taking any regular medication. Feeling " "lightheadedness for "a few months now". Vitals on arrival 95/52, HR 63. Pox 100%. Labs on arrival with H/H 3.0/12.6, currently having 2U RBC transfusion.     - Check orthostatics prior to discharge after completion of transfusion    SWIFT (dyspnea on exertion)  Patient with SWIFT, SOB, lightheadedness for "quite a while". No peripheral lower extremity swelling. Patient has no cardiac hx, no previous Echo done. EKG with RBBB. Labs on arrival with H/H 3.0/12.6. Systolic murmur heard, will do Echo.     Plan:  - Echo  - Transfuse 2U RBC to keep Hgb > 7  - Consider consult to Cardiology if Echo is abnormal  - BNP with AM labs    Abnormal intentional weight loss   Fear associated with healthcare  Patient reports 50lbs weight loss over the last 6 months associated with lack of appetite. Patient reports she is still reeling from grief from losing her 2nd youngest son and sister in [id number]. Patient's mom and dad both had cancer. Patient presents with significant anemia with no overt bleeding. Patient has distrust of healthcare in general following events that happened with her older sister, whom unfortunately passed away. Patient does not want to entertain any preventative screening.     Plan:  - Consult to GI   - Dietitian consult   - Patient needs outpatient PCP, does not have one currently    Iron deficiency anemia due to chronic blood loss  Symptomatic anemia with Hgb 3. Anemia panel reveals CY.     - Start ferrous sulfate x 1 tab daily    Hypokalemia  K 3.1 on arrival. Patient reports some nausea, vomiting and diarrhea after eating some bad food from [employer]. Nausea abated with medication in ED. Diarrhea abated. Likely from GI losses.    - Replete K  - Trend BMP    Nausea & vomiting  Patient reports some N/V and diarrhea ever since having some bad food from [employer]. Nausea and vomiting has abated. Diarrhea has abated in ED. No blood in emesis or stools.     - Strict intake and output  - IV anti-emetics PRN, loperamide " PRN    VTE Risk Mitigation (From admission, onward)         Ordered     IP VTE LOW RISK PATIENT  Once         05/30/23 1602     Place sequential compression device  Until discontinued         05/30/23 1602                On 05/31/2023, patient should be placed in hospital observation services under my care in collaboration with Dr. Bhatt.      Terry Velazquez NP  Department of Hospital Medicine  [facility] [place] - [facility] ([address])

## 2023-05-31 NOTE — CONSULTS
Gastroenterology Consult    5/31/2023  9:49 AM    Consulting Physician:  Asael Olvera MD    Primary Care Provider: Primary Doctor No    Reason for consultation: Anemia    HPI:  Anjelica Fajardo is a 57 y.o. female who presented with complaints of nausea/vomiting and diarrhea with associated dyspnea on exertion for the past 3 days.  Work up showed a Hb 3 with microcytic indices and iron deficiency.  She was admitted with GI consult for evaluation.  She denies any overt GI blood loss.  No change in bowel habits.  She is currently refusing any work up including endoscopy.       Past Medical History:  History reviewed. No pertinent past medical history.    Allergies:   Review of patient's allergies indicates:  No Known Allergies    Current Medications:  Medications Prior to Admission   Medication Sig Dispense Refill Last Dose    calcium-vitamin D3 (OS-JOEL 500 + D3) 500 mg-5 mcg (200 unit) per tablet Take 1 tablet by mouth 2 (two) times daily with meals.            Social History:  Social History     Socioeconomic History    Marital status: Single   Tobacco Use    Smoking status: Some Days     Types: Cigarettes    Smokeless tobacco: Never    Tobacco comments:     Occasional cigarette   Substance and Sexual Activity    Alcohol use: Yes     Alcohol/week: 8.0 standard drinks     Types: 8 Drinks containing 0.5 oz of alcohol per week     Comment: occasionally, 2 days a week, 4 drinks per episode    Drug use: Not Currently       Surgical History:  History reviewed. No pertinent surgical history.      Family History:  Family History   Problem Relation Age of Onset    Breast cancer Mother     Throat cancer Father     Lung cancer Father     Diabetes Sister        Review of systems:     CONSTITUTIONAL: Negative for fever, chills, weakness, weight loss, weight gain.  HEENT: Negative for blurred vision, hearing loss, nasal congestion, dry mouth, sore throat.  CARDIOVASCULAR: Negative for chest pain or  palpitations.  RESPIRATORY: Negative for SOB or cough.  GASTROINTESTINAL: See HPI  GENITOURINARY: Negative for dysuria or hematuria.  MUSCULOSKELETAL: Negative for osteoarthritis or muscle pain.  SKIN: Negative for rashes/lesions.  NEUROLOGIC: Negative for headaches, numbness/tingling.  ENDOCRINE: Negative for diabetes or thyroid abnormalities.  HEMATOLOGIC: Negative for anemia or blood dyscrasias.  Aside from above positives, complete 10 point review of systems negative.    Physical Exam:  Vital Signs (Most Recent):  Temp: 98.4 °F (36.9 °C) (05/31/23 0940)  Pulse: 60 (05/31/23 0940)  Resp: 18 (05/31/23 0940)  BP: (!) 114/57 (05/31/23 0940)  SpO2: 97 % (05/31/23 0940) Vital Signs (24h Range):  Temp:  [96.7 °F (35.9 °C)-98.8 °F (37.1 °C)] 98.4 °F (36.9 °C)  Pulse:  [58-74] 60  Resp:  [16-20] 18  SpO2:  [95 %-100 %] 97 %  BP: ()/(46-60) 114/57       General: Well developed, well nourished, female in no acute distress.    Eyes:  Anicteric sclera, PERRLA  ENT:  Moist mucous membranes, no drainage from ears or nose, hearing grossly intact  Lymph:  No cervical, supraclavicular or axillary lymphadenopathy  Neck:  Supple, no nodes or masses felt, no thyromegaly  Cardiovascular:  Regular rate and rhythm without murmur  Lungs:  Clear to auscultation with normal effort; no wheezes or rales noted  GI:  soft, NTND, no masses  Musculoskeletal:  5/5 strength bilaterally  Extremities: No clubbing, cyanosis, or edema, 2+ dorsalis pedis bilaterally  Neurologic:  No focal deficits, alert and oriented x 3  Psych:  Appropriate mood and affect  Skin:  No rash, no pallor, no lesions       Labs:   Latest Reference Range & Units 05/30/23 10:14 05/30/23 13:29 05/31/23 02:09   WBC 3.90 - 12.70 K/uL 6.49 6.10 8.32   RBC 4.00 - 5.40 M/uL 2.16 (L) 2.05 (L) 2.37 (L)   Hemoglobin 12.0 - 16.0 g/dL 3.1 (LL) 3.0 (LL) 4.6 (LL)   Hematocrit 37.0 - 48.5 % 13.1 (LL) 12.6 (LL) 16.4 (LL)   MCV 82 - 98 fL 61 (L) 62 (L) 69 (L)   MCH 27.0 - 31.0 pg 14.4  (L) 14.6 (L) 19.4 (L)   MCHC 32.0 - 36.0 g/dL 23.7 (L) 23.8 (L) 28.0 (L)   RDW 11.5 - 14.5 % 23.7 (H) 23.7 (H) 29.7 (H)   Platelets 150 - 450 K/uL 274 261 225   (LL): Data is critically low  (L): Data is abnormally low  (H): Data is abnormally high   Latest Reference Range & Units 05/30/23 10:14   Iron 30 - 160 ug/dL 12 (L)   TIBC 250 - 450 ug/dL 570 (H)   Saturated Iron 20 - 50 % 2 (L)   Transferrin 200 - 375 mg/dL 385 (H)   Ferritin 20.0 - 300.0 ng/mL 3 (L)   (L): Data is abnormally low  (H): Data is abnormally high   Latest Reference Range & Units 05/31/23 02:09   Sodium 136 - 145 mmol/L 139   Potassium 3.5 - 5.1 mmol/L 3.4 (L)   Chloride 95 - 110 mmol/L 109   CO2 23 - 29 mmol/L 23   Anion Gap 8 - 16 mmol/L 7 (L)   BUN 6 - 20 mg/dL 9   Creatinine 0.5 - 1.4 mg/dL 0.6   eGFR >60 mL/min/1.73 m^2 >60   Glucose 70 - 110 mg/dL 97   Calcium 8.7 - 10.5 mg/dL 8.4 (L)   Magnesium 1.6 - 2.6 mg/dL 2.0   Cholesterol 120 - 199 mg/dL 104 (L)   HDL 40 - 75 mg/dL 28 (L)   HDL/Cholesterol Ratio 20.0 - 50.0 % 26.9   LDL Cholesterol External 63.0 - 159.0 mg/dL 58.4 (L)   Non-HDL Cholesterol mg/dL 76   Total Cholesterol/HDL Ratio 2.0 - 5.0  3.7   Triglycerides 30 - 150 mg/dL 88   (L): Data is abnormally low    Imaging and Other Studies:  No new    Assessment:  Patient is a 56 yo admitted with complaints of n/v/d and increasing SWIFT.  Found to have a profound microcytic anemia without overt GI blood loss.      Plan:   Discussed the need for both EGD and colonoscopy.  Patient with very poor insight and understanding of her current clinical scenario.  In very basic terms explained that we could be dealing with an occult malignancy and that treatment delays may make her prognosis/treatment more difficult in the long term.  She refused.   OK for diet  Transfuse to goal Hb 7.  Oral iron supplementation.    Follow up as an outpatient for additional work up if desired.  We are available should she change her outlook during this  hospitalization.  Discussed with Zohra Mak NP.      Asael Olvera

## 2023-05-31 NOTE — NURSING
Patient is fussing and threating to leave because she is NPO and can't eat breakfast. Patient states she isn't getting any surgery, or scopes. She is only willing to get an Xray done. She wants her diet change to regular at this time. Dr. Bhatt notified. Awaiting orders.    0723-Dr. Bhatt notified, no new orders given.

## 2023-05-31 NOTE — SUBJECTIVE & OBJECTIVE
History reviewed. No pertinent past medical history.    History reviewed. No pertinent surgical history.    Review of patient's allergies indicates:  Not on File    No current facility-administered medications on file prior to encounter.     Current Outpatient Medications on File Prior to Encounter   Medication Sig    calcium-vitamin D3 (OS-JOEL 500 + D3) 500 mg-5 mcg (200 unit) per tablet Take 1 tablet by mouth 2 (two) times daily with meals.     Family History       Problem Relation (Age of Onset)    Breast cancer Mother    Diabetes Sister    Lung cancer Father    Throat cancer Father          Tobacco Use    Smoking status: Some Days     Types: Cigarettes    Smokeless tobacco: Never    Tobacco comments:     Occasional cigarette   Substance and Sexual Activity    Alcohol use: Yes     Alcohol/week: 8.0 standard drinks     Types: 8 Drinks containing 0.5 oz of alcohol per week     Comment: occasionally, 2 days a week, 4 drinks per episode    Drug use: Not Currently    Sexual activity: Not on file     Review of Systems   Constitutional:  Positive for fatigue. Negative for chills and fever.   HENT:  Negative for congestion and sore throat.    Eyes:  Negative for pain and redness.   Respiratory:  Positive for shortness of breath. Negative for cough and wheezing.    Cardiovascular:  Negative for chest pain and leg swelling.   Gastrointestinal:  Positive for diarrhea, nausea and vomiting. Negative for anal bleeding, blood in stool and constipation.   Genitourinary:  Negative for difficulty urinating, dysuria and vaginal bleeding.   Musculoskeletal:  Positive for back pain (chronic). Negative for gait problem.   Skin:  Negative for rash and wound.   Neurological:  Positive for weakness (generalized) and light-headedness. Negative for numbness.   Psychiatric/Behavioral:  Negative for agitation and behavioral problems.    Objective:     Vital Signs (Most Recent):  Temp: 97.9 °F (36.6 °C) (05/30/23 2058)  Pulse: 62 (05/31/23  0000)  Resp: 20 (05/30/23 2058)  BP: (!) 126/58 (05/30/23 2058)  SpO2: 98 % (05/30/23 2058) Vital Signs (24h Range):  Temp:  [96.7 °F (35.9 °C)-98.6 °F (37 °C)] 97.9 °F (36.6 °C)  Pulse:  [61-74] 62  Resp:  [16-20] 20  SpO2:  [98 %-100 %] 98 %  BP: ()/(46-58) 126/58     Weight: 79.2 kg (174 lb 9.7 oz)  Body mass index is 25.78 kg/m².     Physical Exam  Vitals and nursing note reviewed.   Constitutional:       General: She is not in acute distress.     Appearance: She is not ill-appearing or toxic-appearing.   HENT:      Head: Normocephalic and atraumatic.      Nose: No congestion or rhinorrhea.   Eyes:      General: No scleral icterus.        Right eye: No discharge.         Left eye: No discharge.   Cardiovascular:      Rate and Rhythm: Regular rhythm.      Heart sounds: Murmur heard.   Pulmonary:      Effort: No respiratory distress.      Breath sounds: Normal breath sounds.   Abdominal:      General: Bowel sounds are normal.      Palpations: Abdomen is soft.      Tenderness: There is no abdominal tenderness.   Musculoskeletal:      Right lower leg: No edema.      Left lower leg: No edema.   Skin:     General: Skin is warm and dry.   Neurological:      Mental Status: She is alert and oriented to person, place, and time. Mental status is at baseline.   Psychiatric:         Mood and Affect: Mood normal.         Behavior: Behavior normal.              Significant Labs: All pertinent labs within the past 24 hours have been reviewed.  BMP:   Recent Labs   Lab 05/30/23  1014   GLU 97      K 3.1*      CO2 22*   BUN 6   CREATININE 0.6   CALCIUM 8.8     CBC:   Recent Labs   Lab 05/30/23  1014 05/30/23  1329   WBC 6.49 6.10   HGB 3.1* 3.0*   HCT 13.1* 12.6*    261     CMP:   Recent Labs   Lab 05/30/23  1014      K 3.1*      CO2 22*   GLU 97   BUN 6   CREATININE 0.6   CALCIUM 8.8   PROT 7.2   ALBUMIN 3.6   BILITOT 0.3   ALKPHOS 67   AST 12   ALT 10   ANIONGAP 9     Cardiac Markers: No  results for input(s): CKMB, MYOGLOBIN, BNP, TROPISTAT in the last 48 hours.  Magnesium: No results for input(s): MG in the last 48 hours.  Troponin: No results for input(s): TROPONINI, TROPONINIHS in the last 48 hours.  TSH: No results for input(s): TSH in the last 4320 hours.  Urine Culture: No results for input(s): LABURIN in the last 48 hours.  Urine Studies:   Recent Labs   Lab 05/30/23  1114   COLORU Colorless*   APPEARANCEUA Clear   PHUR 6.0   SPECGRAV 1.010   PROTEINUA Negative   GLUCUA Negative   KETONESU Negative   BILIRUBINUA Negative   OCCULTUA Negative   NITRITE Negative   UROBILINOGEN Negative   LEUKOCYTESUR Negative       Significant Imaging: I have reviewed and interpreted all pertinent imaging results/findings within the past 24 hours.

## 2023-05-31 NOTE — PLAN OF CARE
MSW met with the patient at the bedside.     Patient is alert and oriented with no communication barriers.     Prior to admission, the patient is independent. Patient denies the use of HH or DME.     [facility] Patient choice pharmacy is [facility].    The patient denies having written advance directives.      Patients' family will transport the patient home at discharge.     No CM needs to be identified at this time.     CM team will continue to follow.      05/31/23 0920   Discharge Assessment   Assessment Type Discharge Planning Assessment   Confirmed/corrected address, phone number and insurance Yes   Confirmed Demographics Correct on Facesheet   Source of Information patient;health record   People in Home alone   Do you expect to return to your current living situation? Yes   Do you have help at home or someone to help you manage your care at home? Yes   Who are your caregiver(s) and their phone number(s)? Family   Prior to hospitilization cognitive status: Alert/Oriented   Current cognitive status: Alert/Oriented   Equipment Currently Used at Home none   Readmission within 30 days? No   Do you currently have service(s) that help you manage your care at home? No   Do you take prescription medications? Yes   Do you have prescription coverage? Yes   Do you have any problems affording any of your prescribed medications? No   Are you on dialysis? No   Do you take coumadin? No   Discharge Plan A Home   Discharge Plan B Home with family;Home Health   DME Needed Upon Discharge  none   Discharge Plan discussed with: Patient   Transition of Care Barriers None

## 2023-05-31 NOTE — NURSING
Pt complained of back pain @ approx 0200 with a pain rating of 9 and stated that she has not been sleeping.    Contacted [facility] Cuadra    1x dose of Oxycodone 5mg order put in.     Upon entering room pt was resting and stated that the pain was in her head.     Oxycodone given as ordered.

## 2023-05-31 NOTE — ASSESSMENT & PLAN NOTE
Refusing EGD and CT scan. Does not want any workup beyond an xray and blood work. She says that she will return for a workup if she feels bad.

## 2023-05-31 NOTE — HOSPITAL COURSE
Mrs Dejesus was admitted with HGB of 3 and received 2 units of blood. She received 2 more on 5/31 for hgb of 4.6.  Patient refused gastroenterology workup for distrust of the healthcare system. She also refused CT scans. Mrs Dejesus said that she would like the transfusions only and would come back if she had any further problems. She said she has been anemic all of her life. Workup indications discussed with patient.  She continues to decline testing. Patient would like to follow up with [facility]. Repeat CBC reports hemoglobin of 8. Patient reports that he lightheadedness has resolved and she is ready for discharge.

## 2023-05-31 NOTE — ASSESSMENT & PLAN NOTE
Patient reports some N/V and diarrhea ever since having some bad food from rouses. Nausea and vomiting has abated. Diarrhea has abated in ED. No blood in emesis or stools.     - Strict intake and output  - IV anti-emetics PRN, loperamide PRN

## 2023-05-31 NOTE — ASSESSMENT & PLAN NOTE
" Lightheadedness, SWIFT  Lightheadedness, "woozy", SWIFT, SOB for a few months, finally presented to ED after feeling worsening lightheadedness. Reports having per vaginal bleeding but stopped since menopause "years ago". Reports having per rectal bleeding after PV bleeding "red blood clots with poop if I drank too much alcohol" but has since stopped 3-4 years back. Current BMs are beige in color. No wounds, no bloody emesis, no current PV or MA bleeding, no overt bleeding. Vitals on arrival 95/52, HR 63. Pox 100%. Labs on arrival with H/H 3.0/12.6, currently having 2U RBC transfusion. Patient never had a EGD or colonoscopy done, thinks these procedures are "experimenting on me".     Plan:  - Transfused 4 units PRBC in total  - Trend H/H: imroved to 8/27  - Refused GI workup   "

## 2023-05-31 NOTE — PLAN OF CARE
Problem: Adult Inpatient Plan of Care  Goal: Plan of Care Review  Outcome: Ongoing, Progressing  Goal: Patient-Specific Goal (Individualized)  Outcome: Ongoing, Progressing  Goal: Absence of Hospital-Acquired Illness or Injury  Outcome: Ongoing, Progressing  Goal: Optimal Comfort and Wellbeing  Outcome: Ongoing, Progressing     Problem: Adjustment to Illness (Gastrointestinal Bleeding)  Goal: Optimal Coping with Acute Illness  Outcome: Ongoing, Progressing     Problem: Bleeding (Gastrointestinal Bleeding)  Goal: Hemostasis  Outcome: Ongoing, Progressing
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
## 2023-06-01 NOTE — PLAN OF CARE
Free from falls, injury, or skin breakdown this hospital admission.  Pt eager & in agreement w/ DC. VU of DC instructions--paperwork & pain prescription passed & explained--  scripts e-scribed to pharmacy per MADDIE Mak DNP. IV removed w/ cath tip intact, WNL. Voiding, ambulating, & tolerating PO well.  To be DCd home--will be escorted downstairs via  transport team once dressed, ready & ride arrives. Pt discharged in no distress.

## 2024-09-16 ENCOUNTER — HOSPITAL ENCOUNTER (EMERGENCY)
Facility: OTHER | Age: 59
Discharge: HOME | End: 2024-09-16
Attending: EMERGENCY MEDICINE
Payer: MEDICAID

## 2024-09-16 VITALS
OXYGEN SATURATION: 99 % | BODY MASS INDEX: 25.2 KG/M2 | WEIGHT: 180 LBS | RESPIRATION RATE: 18 BRPM | DIASTOLIC BLOOD PRESSURE: 58 MMHG | TEMPERATURE: 98 F | HEART RATE: 96 BPM | HEIGHT: 71 IN | SYSTOLIC BLOOD PRESSURE: 132 MMHG
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
## 2024-09-16 DIAGNOSIS — S16.1XXA CERVICAL STRAIN, ACUTE, INITIAL ENCOUNTER: ICD-10-CM

## 2024-09-16 PROCEDURE — 25000003 PHARM REV CODE 250

## 2024-09-16 PROCEDURE — 99284 EMERGENCY DEPT VISIT MOD MDM: CPT

## 2024-09-16 RX ORDER — ORPHENADRINE CITRATE 100 MG/1
100 TABLET, EXTENDED RELEASE ORAL
Status: COMPLETED | OUTPATIENT
Start: 2024-09-16 | End: 2024-09-16

## 2024-09-16 RX ORDER — NAPROXEN 500 MG/1
500 TABLET ORAL
Status: COMPLETED | OUTPATIENT
Start: 2024-09-16 | End: 2024-09-16

## 2024-09-16 RX ORDER — NAPROXEN 500 MG/1
500 TABLET ORAL 2 TIMES DAILY WITH MEALS
Qty: 20 TABLET | Refills: 0 | Status: SHIPPED | OUTPATIENT
Start: 2024-09-16

## 2024-09-16 RX ORDER — LIDOCAINE 50 MG/G
1 PATCH TOPICAL
Status: DISCONTINUED | OUTPATIENT
Start: 2024-09-16 | End: 2024-09-16 | Stop reason: HOSPADM

## 2024-09-16 RX ORDER — METHOCARBAMOL 500 MG/1
500 TABLET, FILM COATED ORAL 3 TIMES DAILY
Qty: 21 TABLET | Refills: 0 | Status: SHIPPED | OUTPATIENT
Start: 2024-09-16 | End: 2024-09-23

## 2024-09-16 RX ADMIN — ORPHENADRINE CITRATE 100 MG: 100 TABLET, EXTENDED RELEASE ORAL at 11:09

## 2024-09-16 RX ADMIN — LIDOCAINE 1 PATCH: 50 PATCH CUTANEOUS at 11:09

## 2024-09-16 RX ADMIN — NAPROXEN 500 MG: 500 TABLET ORAL at 11:09
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
## 2025-03-06 ENCOUNTER — HOSPITAL ENCOUNTER (EMERGENCY)
Facility: OTHER | Age: 60
Discharge: LEFT BEFORE DISCHARGE | End: 2025-03-06
Attending: STUDENT IN AN ORGANIZED HEALTH CARE EDUCATION/TRAINING PROGRAM
Payer: MEDICAID

## 2025-03-06 VITALS
HEART RATE: 85 BPM | HEIGHT: 69 IN | RESPIRATION RATE: 19 BRPM | WEIGHT: 160 LBS | TEMPERATURE: 98 F | OXYGEN SATURATION: 100 % | DIASTOLIC BLOOD PRESSURE: 64 MMHG | BODY MASS INDEX: 23.7 KG/M2 | SYSTOLIC BLOOD PRESSURE: 136 MMHG

## 2025-03-06 DIAGNOSIS — R06.02 SHORTNESS OF BREATH: Primary | ICD-10-CM

## 2025-03-06 LAB
CTP QC/QA: YES
CTP QC/QA: YES
OHS QRS DURATION: 148 MS
OHS QTC CALCULATION: 523 MS
POC MOLECULAR INFLUENZA A AGN: NEGATIVE
POC MOLECULAR INFLUENZA B AGN: NEGATIVE
SARS-COV-2 RDRP RESP QL NAA+PROBE: NEGATIVE

## 2025-03-06 PROCEDURE — 93005 ELECTROCARDIOGRAM TRACING: CPT

## 2025-03-06 PROCEDURE — 93010 ELECTROCARDIOGRAM REPORT: CPT | Mod: ,,, | Performed by: INTERNAL MEDICINE

## 2025-03-06 PROCEDURE — 99283 EMERGENCY DEPT VISIT LOW MDM: CPT | Mod: 25

## 2025-03-06 PROCEDURE — 87635 SARS-COV-2 COVID-19 AMP PRB: CPT | Performed by: NURSE PRACTITIONER

## 2025-03-06 RX ORDER — FERROUS SULFATE 325(65) MG
325 TABLET, DELAYED RELEASE (ENTERIC COATED) ORAL DAILY
Qty: 30 TABLET | Refills: 0 | Status: SHIPPED | OUTPATIENT
Start: 2025-03-06 | End: 2025-04-05

## 2025-03-06 NOTE — ED TRIAGE NOTES
Presents awake, alert with c/o SOB with ALONZO x 1 month. States symptoms resolve with rest. No pain reported. + h/o anemia requiring transfusion. Denies obvious bleeding.

## 2025-03-06 NOTE — ED PROVIDER NOTES
Encounter Date: 3/6/2025    SCRIBE #1 NOTE: I, Gina Guevara, am scribing for, and in the presence of,  Shauna High MD. I have scribed the following portions of the note - Other sections scribed: HPI, ROS, PE.       History     Chief Complaint   Patient presents with    Shortness of Breath     Pt states she is short of breath x 1 month. No cp at this time. States that some doctor told her she has COPD but she does not have any inhalers to manage at home. In no distress.     Time seen by provider: 3:32 PM    This is a 59 y.o. female with PMHx of anemia who presents with complaint of dyspnea. She states that she has been short winded and gassy for approximately x1 month. She reports that she takes iron pills, but has not taken them for a while because she needs a refill. Pt also uses a pump, and needs a refill. She also states that she is aware that her blood may be dangerously low, but refuses to get her blood drawn for any tests. Pt denies fever, nausea, cough, hemoptysis, wheezing and congestion.  Had extensive conversation with the patient regarding prior experiences with the healthcare system.  Patient is considerably upset and mistrustful of the healthcare system.    Patient reports previous admission to this hospital for severe anemia.  Believe she has a additional chart under this MRN [id number].  Patient's corroborates that the experiences noted in his chart of the same as her admission it was at the same time.  During this admission she was noted to refuse care numerous times and she is noted to have a significant distrust of the healthcare system.    The history is provided by the patient. No  was used.     Review of patient's allergies indicates:  No Known Allergies  Past Medical History:   Diagnosis Date    Arthritis      Past Surgical History:   Procedure Laterality Date     SECTION       No family history on file.  Social History[1]  Review of Systems   Constitutional:   Negative for activity change, diaphoresis and fever.   HENT:  Negative for ear pain, rhinorrhea, sore throat and trouble swallowing.    Eyes:  Negative for pain and visual disturbance.   Respiratory:  Positive for shortness of breath. Negative for cough and stridor.    Cardiovascular:  Negative for chest pain.   Gastrointestinal:  Negative for abdominal pain, blood in stool, diarrhea, nausea and vomiting.   Genitourinary:  Negative for dysuria, hematuria, vaginal bleeding and vaginal discharge.   Musculoskeletal:  Negative for gait problem.   Skin:  Negative for rash and wound.   Neurological:  Negative for seizures and headaches.   Psychiatric/Behavioral:  Negative for hallucinations and suicidal ideas.        Physical Exam     Initial Vitals [03/06/25 1453]   BP Pulse Resp Temp SpO2   136/64 85 19 98.2 °F (36.8 °C) 100 %      MAP       --         Physical Exam    Constitutional: Vital signs are normal. She appears well-developed and well-nourished. She is not diaphoretic.  Non-toxic appearance. She does not appear ill. No distress.   HENT:   Head: Normocephalic and atraumatic.   Nose: Nose normal. Mouth/Throat: Oropharynx is clear and moist and mucous membranes are normal.   Eyes: EOM and lids are normal.   Neck: Neck supple.   Cardiovascular:  Normal rate, regular rhythm and normal heart sounds.           No murmur heard.  Abdominal: Abdomen is soft. There is no abdominal tenderness.   Musculoskeletal:      Cervical back: Neck supple.     Neurological: She is alert and oriented to person, place, and time. She has normal strength. No cranial nerve deficit.   Skin: Skin is warm, dry and intact. No rash noted. No cyanosis. There is pallor (Pale conjunctiva).   Psychiatric: Her speech is normal. She is not actively hallucinating.   Emotionally labile, paranoia surrounding healthcare She is attentive.         ED Course   Procedures  Labs Reviewed   SARS-COV-2 RDRP GENE       Result Value    POC Rapid COVID Negative        Acceptable Yes     POCT INFLUENZA A/B MOLECULAR    POC Molecular Influenza A Ag Negative      POC Molecular Influenza B Ag Negative       Acceptable Yes          ECG Results              EKG 12-lead (Final result)        Collection Time Result Time QRS Duration O[facility] QTC Calculation    03/06/25 15:03:33 03/06/25 16:14:33 148 523                     Final result by Interface, Lab In [facility] (03/06/25 16:14:40)                   Narrative:    Test Reason : R06.02,    Vent. Rate :  80 BPM     Atrial Rate :  80 BPM     P-R Int : 170 ms          QRS Dur : 148 ms      QT Int : 454 ms       P-R-T Axes :  45 -86  26 degrees    QTcB Int : 523 ms    Normal sinus rhythm  Right bundle branch block  Left anterior fascicular block   Bifascicular block   Abnormal ECG    Confirmed by Carine Flores ([id number]) on 3/6/2025 4:14:31 PM    Referred By: AAAREFERRAL SELF           Confirmed By: Carine Flores                                  Imaging Results    None          Medications - No data to display  Medical Decision Making  Binta Archibald is a 59 y.o. female with h/o iron-deficiency anemia, reported COPD who presents to the ED with 1 month of shortness of breath with exertion     Initial vitals reassuring. Physical exam reveals clear lungs, normal work of breathing and pale conjunctiva.     Patient's presentation concerning for symptomatic anemia. Given history, physical exam, vital signs and chart review, there is low concern for upper GI bleed, pneumonia, COPD exacerbation, PE, ACS.     Patient had COVID and flu swabs in triage and they were negative.  She is adamantly refusing any blood draws.  She is refusing a chest x-ray.  Patient has a history of significant medical distress and is displaying mild paranoia however patient has medical decision-making capacity at this time.  She does not appear acutely suicidal, homicidal or psychotic.  She is able to articulate that she understands the risks of  refusing health care.    Patient is refusing to sign any paperwork.  Verbally went through the AMA form with her.  Patient is able to repeat back that she understands the risks of leaving.The patient is clinically not intoxicated, free from distracting pain, appears to have intact insight, judgment, and reason and in my medical opinion has the capacity to make decisions. The patient is also not under any duress to leave the hospital. In this scenario, it would be battery to subject a patient to treatment against his/her will. I have voiced my concerns for the patient's health given that a full evaluation and treatment had not occurred. I have discussed the need for continued evaluation to determine if their symptoms are caused by a condition that present risk of death or morbidity. Risks including but not limited to death, permanent disability, prolonged hospitalization, prolonged illness, were discussed. However, the patient declined my options and insisted on leaving. Because I have been unable to convince the patient to stay, I answered all of their questions about their condition and asked them to return to the ED as soon as possible to complete their evaluation, especially if their symptoms worsen or do not improve. I emphasized that leaving against medical advice does not preclude returning here for further evaluation. I asked the patient to return if they change their mind about the further evaluation and treatment. I strongly encouraged the patient to return to this Emergency Department or any Emergency Department at any time, particularly with worsening symptoms. Patient verbalized understanding of risks of leaving against medical advice and their ability to return to any Emergency Department for further evaluation.      DISCLAIMER: This note was prepared with [email or web address] voice recognition transcription software. Garbled syntax, mangled pronouns, and other bizarre constructions may be attributed to that  software system.      Amount and/or Complexity of Data Reviewed  External Data Reviewed: notes.  Labs: ordered.    Risk  OTC drugs.            Scribe Attestation:   Scribe #1: I performed the above scribed service and the documentation accurately describes the services I performed. I attest to the accuracy of the note.              Physician Attestation for Scribe: I, Willow High MD, reviewed documentation as scribed in my presence, which is both accurate and complete.                   Clinical Impression:  Final diagnoses:  [R06.02] Shortness of breath (Primary)          ED Disposition Condition    AMA Stable                    [1]   Social History  Tobacco Use    Smoking status: Some Days   Substance Use Topics    Alcohol use: Yes    Drug use: No        Shauna High MD  03/06/25 [id number]

## 2025-03-06 NOTE — ED NOTES
Pt refused chest xray. States that her sister was murdered when she was taken for an xray. States that she just wants an inhaler prescription. Pt offered to have portable xray in the room and I am unclear if this is acceptable to the patient. Pt was agitated during this interaction. Q track RN aware to have patient have joint decision making discussion with provider regarding plan of care.

## 2025-03-06 NOTE — FIRST PROVIDER EVALUATION
Emergency Department TeleTriage Encounter Note      CHIEF COMPLAINT    Chief Complaint   Patient presents with    Shortness of Breath     Pt states she is short of breath x 1 month. No cp at this time. States that some doctor told her she has COPD but she does not have any inhalers to manage at home. In no distress.       VITAL SIGNS   Initial Vitals [03/06/25 1453]   BP Pulse Resp Temp SpO2   136/64 85 19 98.2 °F (36.8 °C) 100 %      MAP       --            ALLERGIES    Review of patient's allergies indicates:  No Known Allergies    PROVIDER TRIAGE NOTE  Verbal consent for the teletriage evaluation was given by the patient at the start of the evaluation.  All efforts will be made to maintain patient's privacy during the evaluation.      This is a teletriage evaluation of a 59 y.o. female presenting to the ED with c/o SOB for 1 month. Limited physical exam via telehealth: The patient is awake, alert, answering questions appropriately and is not in respiratory distress.  As the Teletriage provider, I performed an initial assessment and ordered appropriate labs and imaging studies, if any, to facilitate the patient's care once placed in the ED. Once a room is available, care and a full evaluation will be completed by an alternate ED provider.  Any additional orders and the final disposition will be determined by that provider.  All imaging and labs will not be followed-up by the Teletriage Team, including myself.          ORDERS  Labs Reviewed - No data to display    ED Orders (720h ago, onward)      Start Ordered     Status Ordering Provider    03/06/25 1459 03/06/25 1458  CBC Auto Differential  STAT         Ordered ILEANA MARINELLI    03/06/25 1459 03/06/25 1458  Comprehensive Metabolic Panel  STAT         Ordered ILEANA MARINELLI    03/06/25 1459 03/06/25 1458  Pulse Oximetry Continuous  Continuous         Ordered ILEANA MARINELLI    03/06/25 1459 03/06/25 1458  Cardiac Monitoring - Adult  Continuous         Ordered  ILEANA MARINELLI    03/06/25 1459 03/06/25 1458  EKG 12-lead  Once         Ordered ILEANA MARINELLI    03/06/25 1459 03/06/25 1458  POCT COVID-19 Rapid Screening  Once         Ordered ILEANA MARINELLI    03/06/25 1459 03/06/25 1458  X-Ray Chest PA And Lateral  1 time imaging         Ordered ILEANA MARINELLI    03/06/25 1459 03/06/25 1458  POCT Influenza A/B Molecular  Once         Ordered ILEANA MARINELLI    03/06/25 1459 03/06/25 1458  Saline lock IV  Once         Ordered ILEANA MARINELLI              Virtual Visit Note: The provider triage portion of this emergency department evaluation and documentation was performed via [employer], a HIPAA-compliant telemedicine application, in concert with a tele-presenter in the room. A face to face patient evaluation with one of my colleagues will occur once the patient is placed in an emergency department room.      DISCLAIMER: This note was prepared with [employer] voice recognition transcription software. Garbled syntax, mangled pronouns, and other bizarre constructions may be attributed to that software system.